# Patient Record
Sex: FEMALE | Employment: UNEMPLOYED | URBAN - METROPOLITAN AREA
[De-identification: names, ages, dates, MRNs, and addresses within clinical notes are randomized per-mention and may not be internally consistent; named-entity substitution may affect disease eponyms.]

---

## 2019-07-24 DIAGNOSIS — Z12.31 ENCOUNTER FOR SCREENING MAMMOGRAM FOR MALIGNANT NEOPLASM OF BREAST: Primary | ICD-10-CM

## 2019-08-22 ENCOUNTER — OFFICE VISIT (OUTPATIENT)
Dept: INTERNAL MEDICINE | Facility: CLINIC | Age: 39
End: 2019-08-22
Attending: FAMILY MEDICINE
Payer: MEDICARE

## 2019-08-22 VITALS
SYSTOLIC BLOOD PRESSURE: 124 MMHG | DIASTOLIC BLOOD PRESSURE: 64 MMHG | OXYGEN SATURATION: 99 % | HEIGHT: 67 IN | BODY MASS INDEX: 27.86 KG/M2 | HEART RATE: 72 BPM | WEIGHT: 177.5 LBS

## 2019-08-22 DIAGNOSIS — G89.29 CHRONIC RIGHT SHOULDER PAIN: ICD-10-CM

## 2019-08-22 DIAGNOSIS — G89.29 CHRONIC RIGHT-SIDED LOW BACK PAIN WITH RIGHT-SIDED SCIATICA: Primary | ICD-10-CM

## 2019-08-22 DIAGNOSIS — F51.01 PRIMARY INSOMNIA: ICD-10-CM

## 2019-08-22 DIAGNOSIS — F33.41 RECURRENT MAJOR DEPRESSIVE DISORDER, IN PARTIAL REMISSION: ICD-10-CM

## 2019-08-22 DIAGNOSIS — M54.41 CHRONIC RIGHT-SIDED LOW BACK PAIN WITH RIGHT-SIDED SCIATICA: Primary | ICD-10-CM

## 2019-08-22 DIAGNOSIS — M79.671 RIGHT FOOT PAIN: ICD-10-CM

## 2019-08-22 DIAGNOSIS — M25.511 CHRONIC RIGHT SHOULDER PAIN: ICD-10-CM

## 2019-08-22 DIAGNOSIS — F41.9 ANXIETY: ICD-10-CM

## 2019-08-22 DIAGNOSIS — F43.12 CHRONIC POST-TRAUMATIC STRESS DISORDER (PTSD) AFTER MILITARY COMBAT: ICD-10-CM

## 2019-08-22 DIAGNOSIS — G44.229 CHRONIC TENSION-TYPE HEADACHE, NOT INTRACTABLE: ICD-10-CM

## 2019-08-22 PROCEDURE — 99204 OFFICE O/P NEW MOD 45 MIN: CPT | Mod: S$PBB,,, | Performed by: FAMILY MEDICINE

## 2019-08-22 PROCEDURE — 99999 PR PBB SHADOW E&M-EST. PATIENT-LVL IV: CPT | Mod: PBBFAC,,, | Performed by: FAMILY MEDICINE

## 2019-08-22 PROCEDURE — 99999 PR PBB SHADOW E&M-EST. PATIENT-LVL IV: ICD-10-PCS | Mod: PBBFAC,,, | Performed by: FAMILY MEDICINE

## 2019-08-22 PROCEDURE — 99214 OFFICE O/P EST MOD 30 MIN: CPT | Mod: PBBFAC | Performed by: FAMILY MEDICINE

## 2019-08-22 PROCEDURE — 99204 PR OFFICE/OUTPT VISIT, NEW, LEVL IV, 45-59 MIN: ICD-10-PCS | Mod: S$PBB,,, | Performed by: FAMILY MEDICINE

## 2019-08-22 NOTE — PROGRESS NOTES
"CHIEF COMPLAINT: Establish a primary care physician    HISTORY OF PRESENT ILLNESS: The patient is a 38 year-old BF.  The patient has a long complicated medical history.    Chronic LBP with right sciatica attributed to  service has been a problem for which she has seen pain management outside of the area in the past.    Chronic HAs have been managed by Neurology in the past.    Right shoulder pain is a chronic problem due to her years in the .    [facility], [facility]  Her PTSD is from an incident involving an IED in [place].  As a result of this she does not drive.    She has chronic foot pain for which she has seen Podiatry and had injections in the past as well.    REVIEW OF SYSTEMS:  GENERAL: No fever, chills, fatigability or weight loss.  SKIN: No rashes, itching or changes in color or texture of skin.  HEAD: No recent head trauma.  EYES: Visual acuity fine. No photophobia, ocular pain or diplopia.  EARS: Denies ear pain, discharge or vertigo.  NOSE: No loss of smell, no epistaxis or postnasal drip.  MOUTH & THROAT: No hoarseness or change in voice. No excessive gum bleeding.  NODES: Denies swollen glands.  CHEST: Denies CORREIA, cyanosis, wheezing, cough and sputum production.  CARDIOVASCULAR: Denies chest pain, PND, orthopnea or reduced exercise tolerance.  ABDOMEN: Appetite fine. No weight loss. Denies diarrhea, abdominal pain, hematemesis or blood in stool.  URINARY: No flank pain, dysuria or hematuria.  PERIPHERAL VASCULAR: No claudication or cyanosis.  MUSCULOSKELETAL:  She has chronic pain as above  NEUROLOGIC: No history of seizures, paralysis, alteration of gait or coordination.    SOCIAL HISTORY: The patient does not smoke.  The patient consumes alcohol socially.  The patient is single.    PHYSICAL EXAMINATION:   Blood pressure 124/64, pulse 72, height 5' 7" (1.702 m), weight 80.5 kg (177 lb 7.5 oz), SpO2 99 %.    APPEARANCE: Well nourished, well " developed, in no acute distress.    HEAD: Normocephalic, atraumatic.  EYES: PERRL. EOMI.  Conjunctivae without injection and  anicteric  EARS: TM's intact. Light reflex normal. No retraction or perforation.    NOSE: Mucosa pink. Airway clear.  MOUTH & THROAT: No tonsillar enlargement. No pharyngeal erythema or exudate. No stridor.  NECK: Supple.   NODES: No cervical, axillary or inguinal lymph node enlargement.  CHEST: Lungs clear to auscultation.  No retractions are noted.  No rales or rhonchi are present.  CARDIOVASCULAR: Normal S1, S2. No rubs, murmurs or gallops.  ABDOMEN: Bowel sounds normal. Not distended. Soft. No tenderness or masses.  No ascites is noted.  MUSCULOSKELETAL:  There is no clubbing, cyanosis, or edema of the extremities x4.  There is full range of motion of the lumbar spine.  There is full range of motion of the extremities x4.  There is no deformity noted.    NEUROLOGIC:       Normal speech development.      Hearing normal.      Normal gait.      Motor and sensory exams grossly normal.  PSYCHIATRIC: Patient is alert and oriented x3.  Thought processes are all normal.  There is no homicidality.  There is no suicidality.  There is no evidence of psychosis.    LABORATORY/RADIOLOGY:   Chart reviewed.      ASSESSMENT:   Chronic LBP with right sciatica  Chronic HAs  Right shoulder pain  Chronic foot pain  [facility]     PLAN:  Pain referral  PT referral Phong  Podiatry referral

## 2019-09-13 ENCOUNTER — TELEPHONE (OUTPATIENT)
Dept: PAIN MEDICINE | Facility: CLINIC | Age: 39
End: 2019-09-13

## 2019-09-18 ENCOUNTER — TELEPHONE (OUTPATIENT)
Dept: PAIN MEDICINE | Facility: CLINIC | Age: 39
End: 2019-09-18

## 2019-09-18 NOTE — TELEPHONE ENCOUNTER
Good morning,afternoon Mr./Mrs.    My name is Hannah I am contacting you from [facility] regarding your appointment scheduled for,09/18/2019 with, Renetta Mendez just confirming you will be able to make it.          Staff attempted to leave voicemail for pt mailbox was full.  MD

## 2019-09-20 ENCOUNTER — OFFICE VISIT (OUTPATIENT)
Dept: OBSTETRICS AND GYNECOLOGY | Facility: CLINIC | Age: 39
End: 2019-09-20
Payer: MEDICARE

## 2019-09-20 VITALS
BODY MASS INDEX: 27.68 KG/M2 | DIASTOLIC BLOOD PRESSURE: 70 MMHG | HEIGHT: 67 IN | WEIGHT: 176.38 LBS | SYSTOLIC BLOOD PRESSURE: 118 MMHG

## 2019-09-20 DIAGNOSIS — N76.2 ACUTE VULVITIS: Primary | ICD-10-CM

## 2019-09-20 DIAGNOSIS — N76.0 BACTERIAL VAGINITIS: ICD-10-CM

## 2019-09-20 DIAGNOSIS — B96.89 BACTERIAL VAGINITIS: ICD-10-CM

## 2019-09-20 LAB
BACTERIAL VAGINOSIS DNA: POSITIVE
CANDIDA GLABRATA DNA: NEGATIVE
CANDIDA KRUSEI DNA: NEGATIVE
CANDIDA RRNA VAG QL PROBE: NEGATIVE
T VAGINALIS RRNA GENITAL QL PROBE: NEGATIVE

## 2019-09-20 PROCEDURE — 99212 OFFICE O/P EST SF 10 MIN: CPT | Mod: PBBFAC | Performed by: OBSTETRICS & GYNECOLOGY

## 2019-09-20 PROCEDURE — 99999 PR PBB SHADOW E&M-EST. PATIENT-LVL II: CPT | Mod: PBBFAC,,, | Performed by: OBSTETRICS & GYNECOLOGY

## 2019-09-20 PROCEDURE — 99203 PR OFFICE/OUTPT VISIT, NEW, LEVL III, 30-44 MIN: ICD-10-PCS | Mod: S$PBB,,, | Performed by: OBSTETRICS & GYNECOLOGY

## 2019-09-20 PROCEDURE — 99203 OFFICE O/P NEW LOW 30 MIN: CPT | Mod: S$PBB,,, | Performed by: OBSTETRICS & GYNECOLOGY

## 2019-09-20 PROCEDURE — 99999 PR PBB SHADOW E&M-EST. PATIENT-LVL II: ICD-10-PCS | Mod: PBBFAC,,, | Performed by: OBSTETRICS & GYNECOLOGY

## 2019-09-20 PROCEDURE — 87481 CANDIDA DNA AMP PROBE: CPT | Mod: 59

## 2019-09-20 PROCEDURE — 87661 TRICHOMONAS VAGINALIS AMPLIF: CPT

## 2019-09-20 PROCEDURE — 87801 DETECT AGNT MULT DNA AMPLI: CPT

## 2019-09-20 RX ORDER — CLOTRIMAZOLE AND BETAMETHASONE DIPROPIONATE 10; .64 MG/G; MG/G
CREAM TOPICAL
Qty: 45 G | Refills: 3 | Status: SHIPPED | OUTPATIENT
Start: 2019-09-20

## 2019-09-20 RX ORDER — TINIDAZOLE 500 MG/1
TABLET ORAL
Qty: 8 TABLET | Refills: 1 | Status: SHIPPED | OUTPATIENT
Start: 2019-09-20

## 2019-09-20 NOTE — PROGRESS NOTES
9/20/2019    Bacterial vaginosis DNA Positive (A)   Candida glabrata DNA Negative   Candida krusei DNA Negative   Candida sp Negative   Trichomonas vaginalis Negative   BV    PT HERE WITH 2 DAY HX OF VULVAR ITCH B AFTER NOTICING A WHITE D/C ON TP.  NO ODOR.  IN  AND HAS ANNUAL AT THE BASE.    ROS:  GENERAL: No fever, chills, fatigability or weight loss.  VULVAR: No pain, no lesions and no itching.  VAGINAL: No relaxation, no itching, no discharge, no abnormal bleeding and no lesions.  ABDOMEN: No abdominal pain. Denies nausea. Denies vomiting. No diarrhea. No constipation  BREAST: Denies pain. No lumps. No discharge.  URINARY: No incontinence, no nocturia, no frequency and no dysuria.  CARDIOVASCULAR: No chest pain. No shortness of breath. No leg cramps.  NEUROLOGICAL: No headaches. No vision changes.  The remainder of the review of systems was negative.    PE:  General Appearance: overweight And Well developed. Well nourished. In no acute distress.  Vulva: Lesions: No.  Urethral Meatus: Normal size. Normal location. No lesions. No prolapse.  Urethra: No masses. No tenderness. No prolapse. No scarring.  Bladder: No masses. No tenderness.  Vagina: Mucosa NI:yes discharge yes, atrophy no, cystocele no or rectocele no.  Cervix: Lesion: no  Stenotic: no Cervical motion tenderness: no        PROCEDURES:    PLAN:     DIAGNOSIS:  1. Acute vulvitis    2. Bacterial vaginitis        MEDICATIONS & ORDERS:  Orders Placed This Encounter    Vaginosis Screen by DNA Probe    tinidazole (TINDAMAX) 500 MG tablet    clotrimazole-betamethasone 1-0.05% (LOTRISONE) cream         FOLLOW-UP: With me PRN

## 2019-09-24 ENCOUNTER — OFFICE VISIT (OUTPATIENT)
Dept: PODIATRY | Facility: CLINIC | Age: 39
End: 2019-09-24
Attending: FAMILY MEDICINE
Payer: MEDICARE

## 2019-09-24 VITALS — BODY MASS INDEX: 27.62 KG/M2 | WEIGHT: 176 LBS | HEIGHT: 67 IN

## 2019-09-24 DIAGNOSIS — M79.671 RIGHT FOOT PAIN: Primary | ICD-10-CM

## 2019-09-24 DIAGNOSIS — M72.2 PLANTAR FASCIITIS: ICD-10-CM

## 2019-09-24 PROCEDURE — 99999 PR PBB SHADOW E&M-EST. PATIENT-LVL III: ICD-10-PCS | Mod: PBBFAC,,, | Performed by: PODIATRIST

## 2019-09-24 PROCEDURE — 99999 PR PBB SHADOW E&M-EST. PATIENT-LVL III: CPT | Mod: PBBFAC,,, | Performed by: PODIATRIST

## 2019-09-24 PROCEDURE — 99213 OFFICE O/P EST LOW 20 MIN: CPT | Mod: PBBFAC,PN | Performed by: PODIATRIST

## 2019-09-24 PROCEDURE — 99204 OFFICE O/P NEW MOD 45 MIN: CPT | Mod: S$PBB,,, | Performed by: PODIATRIST

## 2019-09-24 PROCEDURE — 99204 PR OFFICE/OUTPT VISIT, NEW, LEVL IV, 45-59 MIN: ICD-10-PCS | Mod: S$PBB,,, | Performed by: PODIATRIST

## 2019-09-24 RX ORDER — IBUPROFEN 600 MG/1
600 TABLET ORAL EVERY 8 HOURS PRN
Qty: 30 TABLET | Refills: 0 | Status: SHIPPED | OUTPATIENT
Start: 2019-09-24 | End: 2020-04-28

## 2019-09-24 NOTE — PATIENT INSTRUCTIONS
Plantar Fasciitis  Plantar fasciitis is a painful swelling of the plantar fascia. The plantar fascia is a thick, fibrous layer of tissue. It covers the bones on the bottom of your foot. And it supports the foot bones in an arched position.  This can happen gradually or suddenly. It usually affects one foot at a time. Heel pain can be sharp, like a knife sticking into the bottom of your foot. You may feel pain after exercising, long-distance jogging, stair climbing, long periods of standing, or after standing up.  Risk factors include: non-active lifestyle, arthritis, diabetes, obesity or recent weight gain, flat foot, high arch. Wearing high heels, loose shoes, or shoes with poor arch support for long periods of time adds to the risk. This problem is commonly found in runners and dancers. It also found in people who stand on hard surfaces for long periods of time.  Foot pain from this condition is usually worse in the morning. But it improves with walking. By the end of the day there may be a dull aching. Treatment requires short-term rest and controlling swelling. It may take up to 9 months before all symptoms go away. Rarely, a steroid injection into the foot, or surgery, may be needed.  Home care  · If you are overweight, lose weight to help healing.  · Choose supportive shoes with good arch support and shock absorbency. Replace athletic shoes when they become worn out. Dont walk or run barefoot.  · Premade or custom-fitted shoe inserts may be helpful. Inserts made of silicone seem to be the most effective. Custom-made inserts can be provided by a podiatrist or foot specialist, physical therapist, or orthopedist.  · Premade or custom-made night splints keep the heel stretched out while you sleep. They may prevent morning pain.  · Avoid activities that stress the feet: jogging, prolonged standing or walking, contact sports, etc.  · First thing in the morning and before sports, stretch the bottom of your feet.  Gently flex your ankle so the toes move toward your knee.  · Icing may help control heel pain. Apply an ice pack to the heel for 10-20 minutes as a preventive. Or ice your heel after a severe flare-up of symptoms. You may repeat this every 1-2 hours as needed.  · You may use over-the-counter pain medicine to control pain, unless another medicine was prescribed. Anti-inflammatory pain medicines, such as ibuprofen or naproxen, may work better than acetaminophen. If you have chronic liver or kidney disease or ever had a stomach ulcer or GI bleeding, talk with your healthcare provider before using these medicines.  Follow-up care  Follow up with your healthcare provider, physical therapist, or podiatrist or foot specialist as advised.  Call for an appointment if pain worsens or there is no relief after a few weeks of home treatment. Shoe inserts, a night splint, or a special boot may be required.  If X-rays were taken, you will be told of any new findings that may affect your care.  When to seek medical advice  Call your healthcare provider right away if any of these occur:  · Foot swelling  · Redness with increasing pain  Date Last Reviewed: 11/21/2015  © 4109-0746 [employer]. [address] [place] [address], [place] [address]. All rights reserved. This information is not intended as a substitute for professional medical care. Always follow your healthcare professional's instructions.

## 2019-09-24 NOTE — PROGRESS NOTES
Chief Complaint   Patient presents with    Heel Pain     Right foot             HPI:       Marilyn Parker is a 39 y.o. female who presents to clinic complaining of right > left  heel pain for a few years.  Started in 2013.  She was in the ; she had custom insoles for her boots.   Pain is worse with weight bearing and first few steps after prolonged periods of rest.  Pain is better with off her feet.  Patient denies acute trauma to the affected area otherwise.      Treatment tried: cortisone injections in the past; last injection about 2016.    She is wearing soft house slippers today, thin soles.  She does wear athletic shoes.  She does wear very high heels during the week and to [employer].    She has history of bunionectomy to the left foot           Patient Active Problem List   Diagnosis    Right foot pain    Recurrent major depressive disorder, in partial remission    Primary insomnia    Chronic post-traumatic stress disorder (PTSD) after  combat    Chronic tension-type headache, not intractable    Chronic right shoulder pain    Chronic right-sided low back pain with right-sided sciatica           Current Outpatient Medications on File Prior to Visit   Medication Sig Dispense Refill    clotrimazole-betamethasone 1-0.05% (LOTRISONE) cream Apply to affected area 2 times daily 45 g 3    tinidazole (TINDAMAX) 500 MG tablet TAKE 4 TABLETS BY MOUTH ONCE DAILY FOR 2 DAYS 8 tablet 1     No current facility-administered medications on file prior to visit.            Review of patient's allergies indicates:  No Known Allergies      Social History     Socioeconomic History    Marital status: Unknown     Spouse name: Not on file    Number of children: Not on file    Years of education: Not on file    Highest education level: Not on file   Occupational History    Not on file   Social Needs    Financial resource strain: Not on file    Food insecurity:     Worry: Not on file     Inability:  "Not on file    Transportation needs:     Medical: Not on file     Non-medical: Not on file   Tobacco Use    Smoking status: Never Smoker    Smokeless tobacco: Never Used   Substance and Sexual Activity    Alcohol use: Not on file    Drug use: Not on file    Sexual activity: Not on file   Lifestyle    Physical activity:     Days per week: Not on file     Minutes per session: Not on file    Stress: Not on file   Relationships    Social connections:     Talks on phone: Not on file     Gets together: Not on file     Attends Taoist service: Not on file     Active member of club or organization: Not on file     Attends meetings of clubs or organizations: Not on file     Relationship status: Not on file   Other Topics Concern    Not on file   Social History Narrative    Not on file           ROS:  General ROS: negative for  chills, fatigue or fever  Cardiovascular ROS: no chest pain or dyspnea on exertion  Musculoskeletal ROS: negative for joint pain or joint stiffness.  Negative for loss of strength.  Positive for foot pain.   Neuro ROS: Negative for syncope, numbness, or muscle weakness  Skin ROS: Negative for rash, itching or nail/hair changes.           OBJECTIVE:         Vitals:    09/24/19 1143   Weight: 79.8 kg (176 lb)   Height: 5' 7" (1.702 m)        Right Lower extremity exam:  Vasc:   Palpable pedal pulses.   Feet appropriately warm to touch.   Cap refill time is within normal limits   Edema: none    Neurological:    Light touch, proprioception, and Sharp/dull sensation are all intact.   There is no Tinel's along the tarsal tunnel.    Mulders click:   absent  Reflexes:   2+    Derm:   No open lesions, macerations, or rashes  Bruising:  absent  Redness:  absent  Pedal hair:  present      MSK:    Palpable pain central band of plantar fascia right, right Pes Planus and tightness to the Achilles tendon with ROM right   There is no pain with the lateral heel squeeze/compression  test. "           ASSESSMENT/PLAN:           Problem List Items Addressed This Visit     Right foot pain - Primary    Relevant Medications    ibuprofen (ADVIL,MOTRIN) 600 MG tablet      Other Visit Diagnoses     Plantar fasciitis - Right Foot        Relevant Medications    ibuprofen (ADVIL,MOTRIN) 600 MG tablet            I counseled the patient on the patient's conditions, their implications and medical management.     We  discussed proper shoe gear.  Spenco orthotic supports were also recommended.      Discussed icing the affected area as needed and also wearing appropriate shoe gear and avoiding flats, slippers, sandals, and going barefoot.     Stretching exercises.  Handout provided.     We discussed the possibility of a cortisone injection or physical therapy should this not resolve the problem.   follow up in about four weeks.   Patient is amenable to plan.

## 2019-09-26 ENCOUNTER — OFFICE VISIT (OUTPATIENT)
Dept: PAIN MEDICINE | Facility: CLINIC | Age: 39
End: 2019-09-26
Attending: FAMILY MEDICINE
Payer: MEDICARE

## 2019-09-26 VITALS
TEMPERATURE: 99 F | WEIGHT: 178.56 LBS | HEIGHT: 67 IN | HEART RATE: 76 BPM | SYSTOLIC BLOOD PRESSURE: 109 MMHG | DIASTOLIC BLOOD PRESSURE: 68 MMHG | BODY MASS INDEX: 28.02 KG/M2 | RESPIRATION RATE: 18 BRPM | OXYGEN SATURATION: 100 %

## 2019-09-26 DIAGNOSIS — M54.16 LUMBAR RADICULOPATHY: Primary | ICD-10-CM

## 2019-09-26 DIAGNOSIS — S46.019A STRAIN OF ROTATOR CUFF CAPSULE, UNSPECIFIED LATERALITY, INITIAL ENCOUNTER: ICD-10-CM

## 2019-09-26 DIAGNOSIS — M53.3 SACROILIAC JOINT PAIN: ICD-10-CM

## 2019-09-26 DIAGNOSIS — M51.36 DDD (DEGENERATIVE DISC DISEASE), LUMBAR: ICD-10-CM

## 2019-09-26 PROBLEM — G89.4 CHRONIC PAIN DISORDER: Status: ACTIVE | Noted: 2019-09-26

## 2019-09-26 PROCEDURE — 99999 PR PBB SHADOW E&M-EST. PATIENT-LVL V: ICD-10-PCS | Mod: PBBFAC,,, | Performed by: ANESTHESIOLOGY

## 2019-09-26 PROCEDURE — 99204 OFFICE O/P NEW MOD 45 MIN: CPT | Mod: S$PBB,GC,, | Performed by: ANESTHESIOLOGY

## 2019-09-26 PROCEDURE — 99215 OFFICE O/P EST HI 40 MIN: CPT | Mod: PBBFAC | Performed by: ANESTHESIOLOGY

## 2019-09-26 PROCEDURE — 99204 PR OFFICE/OUTPT VISIT, NEW, LEVL IV, 45-59 MIN: ICD-10-PCS | Mod: S$PBB,GC,, | Performed by: ANESTHESIOLOGY

## 2019-09-26 PROCEDURE — 99999 PR PBB SHADOW E&M-EST. PATIENT-LVL V: CPT | Mod: PBBFAC,,, | Performed by: ANESTHESIOLOGY

## 2019-09-26 RX ORDER — AMITRIPTYLINE HYDROCHLORIDE 25 MG/1
25 TABLET, FILM COATED ORAL NIGHTLY
Qty: 30 TABLET | Refills: 2 | Status: SHIPPED | OUTPATIENT
Start: 2019-09-26 | End: 2020-09-25

## 2019-09-26 RX ORDER — MELOXICAM 15 MG/1
15 TABLET ORAL DAILY
Qty: 30 TABLET | Refills: 2 | Status: SHIPPED | OUTPATIENT
Start: 2019-09-26

## 2019-09-26 NOTE — LETTER
October 5, 2019      Blas Davenport MD  [address]  [address]  [facility] [address]           [facility] [address]  [address]  [facility] [address]  Phone: [phone]  Fax: [phone]          Patient: Marilyn Parker   MR Number: [id number]   YOB: 1980   Date of Visit: 9/26/2019       Dear Dr. Blas Davenport:    Thank you for referring Marilyn Parker to me for evaluation. Attached you will find relevant portions of my assessment and plan of care.    If you have questions, please do not hesitate to call me. I look forward to following Marilyn Parker along with you.    Sincerely,    Raciel Haywood MD    Enclosure  CC:  No Recipients    If you would like to receive this communication electronically, please contact [email or web address] or [phone] to request more information on [facility] access.    For providers and/or their staff who would like to refer a patient to [facility], please contact us through our one-stop-shop provider referral line, [facility], at [phone].    If you feel you have received this communication in error or would no longer like to receive these types of communications, please e-mail [email or web address]

## 2019-09-26 NOTE — PROGRESS NOTES
Chronic Pain - New Consult    Referring Physician: Blas Davenport*    Chief Complaint: low back pain     SUBJECTIVE: Disclaimer: This note has been generated using voice-recognition software. There may be typographical errors that have been missed during proof-reading    Initial encounter:    Marilyn Parker presents to the clinic for the evaluation of right sciatic pain. The pain started years ago following  service and symptoms have been unchanged. She also has bilateral shoulder pain due to tears in her shoulders sustained during long marches in the . She has been on flexeril in the past but stopped taking it after she felt it stopped working.   Patient recall epidural injection helping in the past     Brief history:    Pain Description:    The pain is located in the right buttock area and radiates to the knee. Her shoulder pain is bilateral      At BEST  7/10     At WORST  10/10 on the WORST day.      On average pain is rated as 8/10.     Today the pain is rated as 7/10    The pain is described as aching, sharp and shooting      Symptoms interfere with daily activity and work.     Exacerbating factors: Walking and Lifting.      Mitigating factors nothing.     Patient denies night fever/night sweats, urinary incontinence, bowel incontinence, significant weight loss, significant motor weakness and loss of sensations.  Patient denies any suicidal or homicidal ideations    Pain Medications:  Current:  ibuprofen    Tried in Past:  NSAIDs -yes  TCA -Never  SNRI -Never  Anti-convulsants -Never  Muscle Relaxants -yes  Opioids-yes    Physical Therapy/Home Exercise: yes  - long time ago     report:  Reviewed and consistent with medication use as prescribed.    Pain Procedures:   3 lumbar FERNANDO while in the service about 2-3 years ago    Chiropractor -yes  Acupuncture - never  TENS unit -never  Spinal decompression -never  Joint replacement -never    Imaging: none available for review  today    No past medical history on file.  No past surgical history on file.  Social History     Socioeconomic History    Marital status: Unknown     Spouse name: Not on file    Number of children: Not on file    Years of education: Not on file    Highest education level: Not on file   Occupational History    Not on file   Social Needs    Financial resource strain: Not on file    Food insecurity:     Worry: Not on file     Inability: Not on file    Transportation needs:     Medical: Not on file     Non-medical: Not on file   Tobacco Use    Smoking status: Never Smoker    Smokeless tobacco: Never Used   Substance and Sexual Activity    Alcohol use: Not on file    Drug use: Not on file    Sexual activity: Not on file   Lifestyle    Physical activity:     Days per week: Not on file     Minutes per session: Not on file    Stress: Not on file   Relationships    Social connections:     Talks on phone: Not on file     Gets together: Not on file     Attends Faith service: Not on file     Active member of club or organization: Not on file     Attends meetings of clubs or organizations: Not on file     Relationship status: Not on file   Other Topics Concern    Not on file   Social History Narrative    Not on file     No family history on file.    Review of patient's allergies indicates:  No Known Allergies    Current Outpatient Medications   Medication Sig    clotrimazole-betamethasone 1-0.05% (LOTRISONE) cream Apply to affected area 2 times daily    ibuprofen (ADVIL,MOTRIN) 600 MG tablet Take 1 tablet (600 mg total) by mouth every 8 (eight) hours as needed for Pain. Take with a meal    tinidazole (TINDAMAX) 500 MG tablet TAKE 4 TABLETS BY MOUTH ONCE DAILY FOR 2 DAYS     No current facility-administered medications for this visit.        REVIEW OF SYSTEMS:    GENERAL:  No weight loss, malaise or fevers.  HEENT:   No recent changes in vision or hearing. +ve for headaches  NECK:  Negative for lumps, no  "difficulty with swallowing.  RESPIRATORY:  Negative for cough, wheezing or shortness of breath, patient denies any recent URI.  CARDIOVASCULAR:  Negative for chest pain, leg swelling or palpitations.  GI:  Negative for abdominal discomfort, blood in stools or black stools or change in bowel habits.  MUSCULOSKELETAL:  See HPI.  SKIN:  Negative for lesions, rash, and itching.  PSYCH:  No mood disorder or recent psychosocial stressors.  Patients sleep is not disturbed secondary to pain.  HEMATOLOGY/LYMPHOLOGY:  Negative for prolonged bleeding, bruising easily or swollen nodes.  Patient is not currently taking any anti-coagulants  ENDO: No history of diabetes or thyroid dysfunction  NEURO:   No history of headaches, syncope, paralysis, seizures or tremors.  All other reviewed and negative other than HPI.    OBJECTIVE:    /68   Pulse 76   Temp 99.2 °F (37.3 °C)   Resp 18   Ht 5' 7" (1.702 m)   Wt 81 kg (178 lb 9.2 oz)   LMP  (LMP Unknown)   SpO2 100%   BMI 27.97 kg/m²     PHYSICAL EXAMINATION:    GENERAL: Well appearing, in no acute distress, alert and oriented x3.  PSYCH:  Mood and affect appropriate.  SKIN: Skin color, texture, turgor normal, no rashes or lesions.  HEAD/FACE:  Normocephalic, atraumatic. Cranial nerves grossly intact.  NECK: No pain to palpation over the cervical paraspinous muscles. Spurling Negative. No pain with neck flexion, extension, or lateral flexion.   CV: RRR with palpation of the radial artery.  PULM: No evidence of respiratory difficulty, symmetric chest rise.  GI:  Soft and non-tender.  BACK: Straight leg raising in the supine position is negative to radicular pain. No pain to palpation over the facet joints of the lumbar spine or spinous processes. Normal range of motion without pain reproduction.  EXTREMITIES: Peripheral joint ROM is full and pain free without obvious instability or laxity in all four extremities. No deformities, edema, or skin discoloration. Good capillary " refill. Shoulder extension limited by pain at 70degrees. +Neers bilaterally. Painful external shoulder rotation.  MUSCULOSKELETAL: Shoulder, hip, and knee provocative maneuvers are negative.  +TTP Right SIJ/Piriformis.  FABERs positive on Right.   Bilateral upper and lower extremity strength is normal and symmetric.  No atrophy or tone abnormalities are noted.  NEURO: Bilateral upper and lower extremity coordination and muscle stretch reflexes are physiologic and symmetric.  Plantar response are downgoing. No clonus.  No loss of sensation is noted.  GAIT: normal.    ASSESSMENT: 39 y.o. year old female with pain, consistent with     Encounter Diagnoses   Name Primary?    Lumbar radiculopathy Yes    Sacroiliac joint pain     Strain of rotator cuff capsule, unspecified laterality, initial encounter     DDD (degenerative disc disease), lumbar        PLAN:   - I have stressed the importance of physical activity and a home exercise plan to help with pain and improve health.    - Patient can continue with medications for now since they are providing benefits, using them appropriately, and without side effects.    - Start Mobic 15mg qday    - Stop ibuprofen    - Start Amitriptyline 25mg qhs    - XR Lumbar with Flexion/Extension; XR Shoulder Bilateral; MRI Lumbar; MRI Shoulders with Contrast    - Consider Orthopedic consult at future visit    - Referral to PT    - RTC 1 Month    - Will obtain outside records    - Consider repeat FERNANDO at future visit    The above plan and management options were discussed at length with patient. Patient is in agreement with the above and verbalized understanding. It will be communicated with the referring physician via electronic record, fax, or mail.    Wesley Rizvi    I have personally reviewed the history and exam of this patient and agree with the resident/fellow/NPs note as stated above.    Raciel Haywood MD    09/26/2019

## 2019-09-27 ENCOUNTER — TELEPHONE (OUTPATIENT)
Dept: PAIN MEDICINE | Facility: CLINIC | Age: 39
End: 2019-09-27

## 2019-10-05 PROBLEM — G89.4 CHRONIC PAIN DISORDER: Status: RESOLVED | Noted: 2019-09-26 | Resolved: 2019-10-05

## 2019-10-21 ENCOUNTER — DOCUMENTATION ONLY (OUTPATIENT)
Dept: REHABILITATION | Facility: OTHER | Age: 39
End: 2019-10-21

## 2019-11-05 ENCOUNTER — TELEPHONE (OUTPATIENT)
Dept: PAIN MEDICINE | Facility: CLINIC | Age: 39
End: 2019-11-05

## 2019-11-05 NOTE — TELEPHONE ENCOUNTER
Called to confirm appointment with Dr. Haywood on 11/7/19 @ 9:30 in Pain management clinic.    Pt did not answer. Left pt detailed message to contact office @ [phone] to confirm appointment

## 2019-12-11 ENCOUNTER — TELEPHONE (OUTPATIENT)
Dept: PAIN MEDICINE | Facility: CLINIC | Age: 39
End: 2019-12-11

## 2019-12-11 NOTE — TELEPHONE ENCOUNTER
Called to confirm appointment with Dr. Haywood on 12/12/19 @ 9:15AM in Pain management clinic.    Pt did not answer.     VOICE MAILBOX FULL       NOT ACTIVE IN PORTAL

## 2020-02-11 ENCOUNTER — PATIENT OUTREACH (OUTPATIENT)
Dept: ADMINISTRATIVE | Facility: HOSPITAL | Age: 40
End: 2020-02-11

## 2020-02-11 DIAGNOSIS — Z12.4 PAP SMEAR FOR CERVICAL CANCER SCREENING: Primary | ICD-10-CM

## 2020-04-21 ENCOUNTER — PATIENT MESSAGE (OUTPATIENT)
Dept: PODIATRY | Facility: CLINIC | Age: 40
End: 2020-04-21

## 2020-04-21 ENCOUNTER — PATIENT MESSAGE (OUTPATIENT)
Dept: INTERNAL MEDICINE | Facility: CLINIC | Age: 40
End: 2020-04-21

## 2020-04-21 DIAGNOSIS — M79.671 RIGHT FOOT PAIN: ICD-10-CM

## 2020-04-21 DIAGNOSIS — M72.2 PLANTAR FASCIITIS: ICD-10-CM

## 2020-04-21 RX ORDER — IBUPROFEN 600 MG/1
600 TABLET ORAL EVERY 8 HOURS PRN
Qty: 30 TABLET | Refills: 0 | Status: CANCELLED | OUTPATIENT
Start: 2020-04-21

## 2020-04-27 ENCOUNTER — TELEPHONE (OUTPATIENT)
Dept: PODIATRY | Facility: CLINIC | Age: 40
End: 2020-04-27

## 2020-04-27 ENCOUNTER — PATIENT MESSAGE (OUTPATIENT)
Dept: PAIN MEDICINE | Facility: CLINIC | Age: 40
End: 2020-04-27

## 2020-04-27 ENCOUNTER — PATIENT MESSAGE (OUTPATIENT)
Dept: PODIATRY | Facility: CLINIC | Age: 40
End: 2020-04-27

## 2020-04-27 NOTE — TELEPHONE ENCOUNTER
----- Message from Angela Luevano sent at 4/27/2020  4:06 PM CDT -----  Hi, Mrs. Parker was trying to get a new prescription for her Ibuprofen that she last received in Sept of 2019 because she is feeling pain again. Thank you

## 2020-04-28 ENCOUNTER — OFFICE VISIT (OUTPATIENT)
Dept: PODIATRY | Facility: CLINIC | Age: 40
End: 2020-04-28
Payer: MEDICARE

## 2020-04-28 DIAGNOSIS — M72.2 PLANTAR FASCIITIS: Primary | ICD-10-CM

## 2020-04-28 DIAGNOSIS — M79.671 RIGHT FOOT PAIN: ICD-10-CM

## 2020-04-28 PROCEDURE — 99212 OFFICE O/P EST SF 10 MIN: CPT | Mod: 95,,, | Performed by: PODIATRIST

## 2020-04-28 PROCEDURE — 99212 PR OFFICE/OUTPT VISIT, EST, LEVL II, 10-19 MIN: ICD-10-PCS | Mod: 95,,, | Performed by: PODIATRIST

## 2020-04-28 RX ORDER — IBUPROFEN 600 MG/1
600 TABLET ORAL EVERY 8 HOURS PRN
Qty: 30 TABLET | Refills: 1 | Status: SHIPPED | OUTPATIENT
Start: 2020-04-28

## 2020-04-28 NOTE — PROGRESS NOTES
Podiatry Virtual Visit.      The patient location is:  home  The chief complaint leading to consultation is:  Right heel pain  Visit type: audiovisual  Total time spent with patient:  15 min  Each patient to whom he or she provides medical services by telemedicine is:  (1) informed of the relationship between the physician and patient and the respective role of any other health care provider with respect to management of the patient; and (2) notified that he or she may decline to receive medical services by telemedicine and may withdraw from such care at any time.          HPI:       Marilyn Parkre is a 39 y.o. female with complaints of left greater than right heel pain for the past few years.  She was last seen in Podiatry on September 2010.      She was in the ; she had custom insoles for her boots.   Pain is worse with weight bearing and first few steps after prolonged periods of rest.  Pain is better with off her feet.   Lately the heels have been hurting throughout the day.  Patient denies acute trauma to the affected area otherwise since her last visit.    Treatment tried: cortisone injections in the past; last injection about 2016.   She was prescribed ibuprofen last visit.  She reports that she needs refill.     She has history of bunionectomy to the left foot           Patient Active Problem List   Diagnosis    Right foot pain    Recurrent major depressive disorder, in partial remission    Primary insomnia    Chronic post-traumatic stress disorder (PTSD) after  combat    Chronic tension-type headache, not intractable    Chronic right shoulder pain    Chronic right-sided low back pain with right-sided sciatica    Sacroiliac joint pain    Lumbar radiculopathy    Strain of rotator cuff capsule    DDD (degenerative disc disease), lumbar           Current Outpatient Medications on File Prior to Visit   Medication Sig Dispense Refill    amitriptyline (ELAVIL) 25 MG tablet Take 1  tablet (25 mg total) by mouth every evening. 30 tablet 2    clotrimazole-betamethasone 1-0.05% (LOTRISONE) cream Apply to affected area 2 times daily 45 g 3    meloxicam (MOBIC) 15 MG tablet Take 1 tablet (15 mg total) by mouth once daily. 30 tablet 2    tinidazole (TINDAMAX) 500 MG tablet TAKE 4 TABLETS BY MOUTH ONCE DAILY FOR 2 DAYS 8 tablet 1    [DISCONTINUED] ibuprofen (ADVIL,MOTRIN) 600 MG tablet Take 1 tablet (600 mg total) by mouth every 8 (eight) hours as needed for Pain. Take with a meal 30 tablet 0     No current facility-administered medications on file prior to visit.            Review of patient's allergies indicates:  No Known Allergies      Social History     Socioeconomic History    Marital status: Unknown     Spouse name: Not on file    Number of children: Not on file    Years of education: Not on file    Highest education level: Not on file   Occupational History    Not on file   Social Needs    Financial resource strain: Not on file    Food insecurity:     Worry: Not on file     Inability: Not on file    Transportation needs:     Medical: Not on file     Non-medical: Not on file   Tobacco Use    Smoking status: Never Smoker    Smokeless tobacco: Never Used   Substance and Sexual Activity    Alcohol use: Not on file    Drug use: Not on file    Sexual activity: Not on file   Lifestyle    Physical activity:     Days per week: Not on file     Minutes per session: Not on file    Stress: Not on file   Relationships    Social connections:     Talks on phone: Not on file     Gets together: Not on file     Attends Tenriism service: Not on file     Active member of club or organization: Not on file     Attends meetings of clubs or organizations: Not on file     Relationship status: Not on file   Other Topics Concern    Not on file   Social History Narrative    Not on file           ROS:  General ROS: negative for  chills, fatigue or fever  Cardiovascular ROS: no chest pain or dyspnea  on exertion  Musculoskeletal ROS: negative for joint pain or joint stiffness.  Negative for loss of strength.  Positive for foot pain.   Neuro ROS: Negative for syncope, numbness, or muscle weakness  Skin ROS: Negative for rash, itching or nail/hair changes.         Exam from 9/24/2019 Reviewed.   OBJECTIVE:     Right Lower extremity exam:  Vasc:   Palpable pedal pulses.   Feet appropriately warm to touch.   Cap refill time is within normal limits   Edema: none    Neurological:    Light touch, proprioception, and Sharp/dull sensation are all intact.   There is no Tinel's along the tarsal tunnel.    Mulders click:   absent  Reflexes:   2+    Derm:   No open lesions, macerations, or rashes  Bruising:  absent  Redness:  absent  Pedal hair:  present    MSK:    Palpable pain central band of plantar fascia right, right Pes Planus and tightness to the Achilles tendon with ROM right   There is no pain with the lateral heel squeeze/compression  test.           ASSESSMENT/PLAN:           Problem List Items Addressed This Visit     Right foot pain    Relevant Medications    ibuprofen (ADVIL,MOTRIN) 600 MG tablet      Other Visit Diagnoses     Plantar fasciitis - Right Foot    -  Primary    Relevant Medications    ibuprofen (ADVIL,MOTRIN) 600 MG tablet            Total time spent face to face with the patient:  15 minutes.   More than 50% of the time was spent counseling/coordinating care:   I counseled the patient on the patient's conditions, their implications and medical management.   Continue stretching.  Stretching band will be available for patient to  tomorrow per request.   Advised the patient to consider trying Spenco orthotic Arch insole.    Advised to not walk barefoot.    We discussed the possibility of a cortisone injection.   follow up in about four weeks.   Patient is amenable to plan.

## 2020-05-15 ENCOUNTER — PATIENT MESSAGE (OUTPATIENT)
Dept: PAIN MEDICINE | Facility: CLINIC | Age: 40
End: 2020-05-15

## 2020-07-17 DIAGNOSIS — Z71.89 COMPLEX CARE COORDINATION: ICD-10-CM

## 2020-10-05 ENCOUNTER — PATIENT MESSAGE (OUTPATIENT)
Dept: ADMINISTRATIVE | Facility: HOSPITAL | Age: 40
End: 2020-10-05

## 2020-10-27 ENCOUNTER — TELEPHONE (OUTPATIENT)
Dept: PODIATRY | Facility: CLINIC | Age: 40
End: 2020-10-27

## 2020-10-27 NOTE — TELEPHONE ENCOUNTER
Spoke with patient and schedule appointment.          ----- Message from Julianne Jon sent at 10/27/2020  3:57 PM CDT -----  Can the clinic reply in [facility]:     Please refill the medication(s) listed below. The patient can be reached at this phone number once it is called into the pharmacy.   [phone]    Medication #1   ibuprofen (ADVIL,MOTRIN) 600 MG tablet    Medication #2    [place]   [address] [address] [phone]

## 2021-01-05 ENCOUNTER — PATIENT MESSAGE (OUTPATIENT)
Dept: ADMINISTRATIVE | Facility: HOSPITAL | Age: 41
End: 2021-01-05

## 2021-01-26 ENCOUNTER — PATIENT OUTREACH (OUTPATIENT)
Dept: ADMINISTRATIVE | Facility: HOSPITAL | Age: 41
End: 2021-01-26

## 2021-01-26 ENCOUNTER — PATIENT MESSAGE (OUTPATIENT)
Dept: ADMINISTRATIVE | Facility: HOSPITAL | Age: 41
End: 2021-01-26

## 2021-01-26 DIAGNOSIS — Z12.4 CERVICAL CANCER SCREENING: ICD-10-CM

## 2021-01-26 DIAGNOSIS — Z13.220 ENCOUNTER FOR LIPID SCREENING FOR CARDIOVASCULAR DISEASE: Primary | ICD-10-CM

## 2021-01-26 DIAGNOSIS — Z12.31 ENCOUNTER FOR SCREENING MAMMOGRAM FOR BREAST CANCER: ICD-10-CM

## 2021-01-26 DIAGNOSIS — Z11.59 NEED FOR HEPATITIS C SCREENING TEST: ICD-10-CM

## 2021-01-26 DIAGNOSIS — Z13.6 ENCOUNTER FOR LIPID SCREENING FOR CARDIOVASCULAR DISEASE: Primary | ICD-10-CM

## 2021-04-05 ENCOUNTER — PATIENT MESSAGE (OUTPATIENT)
Dept: ADMINISTRATIVE | Facility: HOSPITAL | Age: 41
End: 2021-04-05

## 2021-04-16 ENCOUNTER — PATIENT MESSAGE (OUTPATIENT)
Dept: RESEARCH | Facility: HOSPITAL | Age: 41
End: 2021-04-16

## 2021-10-05 ENCOUNTER — PATIENT MESSAGE (OUTPATIENT)
Dept: ADMINISTRATIVE | Facility: HOSPITAL | Age: 41
End: 2021-10-05

## 2022-01-12 ENCOUNTER — PATIENT OUTREACH (OUTPATIENT)
Dept: ADMINISTRATIVE | Facility: HOSPITAL | Age: 42
End: 2022-01-12
Payer: MEDICARE

## 2022-01-12 ENCOUNTER — PATIENT MESSAGE (OUTPATIENT)
Dept: ADMINISTRATIVE | Facility: HOSPITAL | Age: 42
End: 2022-01-12
Payer: MEDICARE

## 2022-05-30 ENCOUNTER — PATIENT MESSAGE (OUTPATIENT)
Dept: ADMINISTRATIVE | Facility: HOSPITAL | Age: 42
End: 2022-05-30
Payer: MEDICARE